# Patient Record
Sex: FEMALE | Race: WHITE
[De-identification: names, ages, dates, MRNs, and addresses within clinical notes are randomized per-mention and may not be internally consistent; named-entity substitution may affect disease eponyms.]

---

## 2018-04-13 ENCOUNTER — HOSPITAL ENCOUNTER (OUTPATIENT)
Dept: HOSPITAL 17 - ESDC | Age: 48
Discharge: HOME | End: 2018-04-13
Attending: SURGERY
Payer: COMMERCIAL

## 2018-04-13 DIAGNOSIS — N60.81: Primary | ICD-10-CM

## 2018-04-13 PROCEDURE — 88307 TISSUE EXAM BY PATHOLOGIST: CPT

## 2018-04-13 PROCEDURE — 88304 TISSUE EXAM BY PATHOLOGIST: CPT

## 2018-04-13 PROCEDURE — 19120 REMOVAL OF BREAST LESION: CPT

## 2018-04-13 PROCEDURE — 00400 ANES INTEGUMENTARY SYS NOS: CPT

## 2018-04-13 NOTE — PD.OP
cc:   Johnnie Burt MD; Celeste Fernández MD


__________________________________________________





Operative Report


Date of Surgery:  Apr 13, 2018


Preoperative Diagnosis:  


(1) Breast mass, right


Postoperative Diagnosis:  


(1) Breast mass, right


Procedure:


Excision right breast mass


Anesthesia:


GEN


Surgeon:


Johnnie Burt


Assistant(s):


Staff


Operation and Findings:


EBL: 5 cc





Operative findings: The right lateral border of the right areola there was an 

approximately 5 mm slightly raised skin lesion and deep and medial to the 

lesion a small mass which the patient reported fluctuates in size and 

occasionally has whitish drainage.  An excision including skin nodule and 

underlying mass was performed along the right lateral border of the areola and 

the mass appeared to be consistent with a sebaceous cyst.





Procedure in detail: The patient was taken to the operating room placed in 

supine position.  General anesthesia via LMA was induced.  The right breast was 

prepped and draped in usual sterile fashion.  The area of the small skin nodule 

and small underlying mass were marked and a curvilinear excision including the 

skin nodule was performed.  Dissection was carried out down and around the 

underlying mass.  Small area of the mass was incidentally entered with sebum 

present.  The entire mass was excised and removed.  Hemostasis was achieved.  

The mass is most likely consistent with sebaceous cyst.  Irrigation was 

performed.  Closure was performed with deep dermal simple interrupted 3-0 

Vicryl suture and subcuticular 4-0 Monocryl as well as Steri-Strips.  The 

patient tolerated procedure well and was taken to PACU in stable condition.











Johnnie Burt MD Apr 13, 2018 08:15

## 2021-08-02 ENCOUNTER — APPOINTMENT (RX ONLY)
Dept: URBAN - METROPOLITAN AREA CLINIC 80 | Facility: CLINIC | Age: 51
Setting detail: DERMATOLOGY
End: 2021-08-02

## 2021-08-02 DIAGNOSIS — L0390 CELLULITIS AND ABSCESS OF UNSPECIFIED SITES: ICD-10-CM | Status: RESOLVED

## 2021-08-02 DIAGNOSIS — L0391 CELLULITIS AND ABSCESS OF UNSPECIFIED SITES: ICD-10-CM | Status: RESOLVED

## 2021-08-02 PROBLEM — L02.211 CUTANEOUS ABSCESS OF ABDOMINAL WALL: Status: ACTIVE | Noted: 2021-08-02

## 2021-08-02 PROBLEM — L02.213 CUTANEOUS ABSCESS OF CHEST WALL: Status: ACTIVE | Noted: 2021-08-02

## 2021-08-02 PROCEDURE — ? ADDITIONAL NOTES

## 2021-08-02 PROCEDURE — 99203 OFFICE O/P NEW LOW 30 MIN: CPT

## 2021-08-02 PROCEDURE — ? COUNSELING

## 2021-08-02 PROCEDURE — ? PRESCRIPTION

## 2021-08-02 PROCEDURE — ? DRESSING CHANGE

## 2021-08-02 RX ORDER — DOXYCYCLINE HYCLATE 100 MG/1
CAPSULE, GELATIN COATED ORAL BID
Qty: 28 | Refills: 0 | Status: ERX | COMMUNITY
Start: 2021-08-02

## 2021-08-02 RX ADMIN — DOXYCYCLINE HYCLATE: 100 CAPSULE, GELATIN COATED ORAL at 00:00

## 2021-08-02 ASSESSMENT — LOCATION SIMPLE DESCRIPTION DERM: LOCATION SIMPLE: ABDOMEN

## 2021-08-02 ASSESSMENT — LOCATION DETAILED DESCRIPTION DERM
LOCATION DETAILED: SUBXIPHOID
LOCATION DETAILED: LEFT LATERAL ABDOMEN

## 2021-08-02 ASSESSMENT — LOCATION ZONE DERM: LOCATION ZONE: TRUNK

## 2021-08-02 NOTE — HPI: CYST
How Severe Is Your Cyst?: moderate
Is This A New Presentation, Or A Follow-Up?: Cyst
Additional History: Patient had cyst surgically removed at an office in October 2020 but the same lesion is now inflamed and draining.

## 2021-08-02 NOTE — PROCEDURE: DRESSING CHANGE
Detail Level: Detailed
Wound Evaluated By: Mica Trujillo
Add 86993 Cpt? (Important Note: In 2017 The Use Of 50062 Is Being Tracked By Cms To Determine Future Global Period Reimbursement For Global Periods): no

## 2021-10-05 ENCOUNTER — APPOINTMENT (RX ONLY)
Dept: URBAN - METROPOLITAN AREA CLINIC 52 | Facility: CLINIC | Age: 51
Setting detail: DERMATOLOGY
End: 2021-10-05

## 2021-10-05 DIAGNOSIS — L71.8 OTHER ROSACEA: ICD-10-CM

## 2021-10-05 DIAGNOSIS — L72.0 EPIDERMAL CYST: ICD-10-CM

## 2021-10-05 PROCEDURE — ? COUNSELING

## 2021-10-05 PROCEDURE — ? PRESCRIPTION

## 2021-10-05 PROCEDURE — 99214 OFFICE O/P EST MOD 30 MIN: CPT

## 2021-10-05 PROCEDURE — ? ADDITIONAL NOTES

## 2021-10-05 RX ORDER — TRETIONIN 0.25 MG/G
CREAM TOPICAL
Qty: 45 | Refills: 0 | COMMUNITY
Start: 2021-10-05

## 2021-10-05 RX ADMIN — TRETIONIN: 0.25 CREAM TOPICAL at 00:00

## 2021-10-05 ASSESSMENT — LOCATION DETAILED DESCRIPTION DERM
LOCATION DETAILED: LEFT INFERIOR CENTRAL MALAR CHEEK
LOCATION DETAILED: LEFT LATERAL MALAR CHEEK
LOCATION DETAILED: LEFT INFERIOR LATERAL BUCCAL CHEEK

## 2021-10-05 ASSESSMENT — LOCATION SIMPLE DESCRIPTION DERM: LOCATION SIMPLE: LEFT CHEEK

## 2021-10-05 ASSESSMENT — LOCATION ZONE DERM: LOCATION ZONE: FACE
